# Patient Record
Sex: FEMALE | Race: WHITE | NOT HISPANIC OR LATINO | Employment: OTHER | URBAN - METROPOLITAN AREA
[De-identification: names, ages, dates, MRNs, and addresses within clinical notes are randomized per-mention and may not be internally consistent; named-entity substitution may affect disease eponyms.]

---

## 2018-08-21 ENCOUNTER — OFFICE VISIT (OUTPATIENT)
Dept: DERMATOLOGY | Facility: CLINIC | Age: 73
End: 2018-08-21
Payer: MEDICARE

## 2018-08-21 DIAGNOSIS — S20.162A: Primary | ICD-10-CM

## 2018-08-21 DIAGNOSIS — L82.1 SEBORRHEIC KERATOSIS: ICD-10-CM

## 2018-08-21 DIAGNOSIS — Z13.89 SCREENING FOR SKIN CONDITION: ICD-10-CM
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
## 2018-08-21 PROCEDURE — 99203 OFFICE O/P NEW LOW 30 MIN: CPT | Performed by: DERMATOLOGY

## 2018-08-21 RX ORDER — LEVOTHYROXINE SODIUM 100 UG/1
CAPSULE ORAL DAILY
COMMUNITY

## 2018-08-21 RX ORDER — LOSARTAN POTASSIUM 25 MG/1
TABLET ORAL
COMMUNITY

## 2018-08-21 NOTE — PATIENT INSTRUCTIONS
process on the left breast appears to be most consistent with insect bite reaction with postinflammatory erythema no treatment needed should resolve if any further changes or growth is noted patient is to call  Seborrheic Keratosis  Patient reasurred these are normal growths we acquire with age no treatment needed    Screening for Dermatologic Disorders: Nothing else of concern noted on complete exam follow up prn

## 2018-08-21 NOTE — PROGRESS NOTES
[address] [facility] DERMATOLOGY  [address] [place] [address]  [phone]  [phone]     MRN: [id number] : 1945  Encounter: [id number]  Patient Information: Dulce Key  Chief complaint:Skin check and lesion on left chest    History of present illness:  66-year-old female who not seen for many years presents for overall checkup no previous history of skin cancer presents secondary to concerns regarding lesion that she noted on her left chest 1st several weeks  Patient denies any injury of the area  No past medical history on file  No past surgical history on file  Social History   History   Alcohol use Not on file     History   Drug use: Unknown     History   Smoking Status    Not on file   Smokeless Tobacco    Not on file     No family history on file  Meds/Allergies   Allergies not on file    Meds:  Prior to Admission medications    Medication Sig Start Date End Date Taking? Authorizing Provider   Cholecalciferol (VITAMIN D3) 1000 UNIT/SPRAY LIQD Take by oral route     Yes Historical Provider, MD   Levothyroxine Sodium 100 MCG CAPS Daily   Yes Historical Provider, MD   losartan (COZAAR) 25 mg tablet 1 tab(s)   Yes Historical Provider, MD       Subjective:     Review of Systems:    General: negative for - chills, fatigue, fever,  weight gain or weight loss  Psychological: negative for - anxiety, behavioral disorder, concentration difficulties, decreased libido, depression, irritability, memory difficulties, mood swings, sleep disturbances or suicidal ideation  ENT: negative for - hearing difficulties , nasal congestion, nasal discharge, oral lesions, sinus pain, sneezing, sore throat  Allergy and Immunology: negative for - hives, insect bite sensitivity,  Hematological and Lymphatic: negative for - bleeding problems, blood clots,bruising, swollen lymph nodes  Endocrine: negative for - hair pattern changes, hot flashes, malaise/lethargy, mood swings, palpitations, polydipsia/polyuria, skin changes, temperature intolerance or unexpected weight change  Respiratory: negative for - cough, hemoptysis, orthopnea, shortness of breath, or wheezing  Cardiovascular: negative for - chest pain, dyspnea on exertion, edema,  Gastrointestinal: negative for - abdominal pain, nausea/vomiting  Genito-Urinary: negative for - dysuria, incontinence, irregular/heavy menses or urinary frequency/urgency  Musculoskeletal: negative for - gait disturbance, joint pain, joint stiffness, joint swelling, muscle pain, muscular weakness  Dermatological:  As in HPI  Neurological: negative for confusion, dizziness, headaches, impaired coordination/balance, memory loss, numbness/tingling, seizures, speech problems, tremors or weakness       Objective: There were no vitals taken for this visit  Physical Exam:    General Appearance:    Alert, cooperative, no distress   Head:    Normocephalic, without obvious abnormality, atraumatic           Skin:   A full skin exam was performed including scalp, head scalp, eyes, ears, nose, lips, neck, chest, axilla, abdomen, back, buttocks, bilateral upper extremities, bilateral lower extremities, hands, feet, fingers, toes, fingernails, and toenails  4 mm pink macule noted on the left chest which blanches with pressure no discernible epidermal changes noted normal keratotic papules with greasy stuck on appearance nothing else remarkable noted on exam     Assessment:     1  Insect bite (nonvenomous) of breast, left breast, initial encounter     2  Seborrheic keratosis     3  Screening for skin condition           Plan:    process on the left breast appears to be most consistent with insect bite reaction with postinflammatory erythema no treatment needed should resolve if any further changes or growth is noted patient is to call  Seborrheic Keratosis  Patient reasurred these are normal growths we acquire with age no treatment needed    Screening for Dermatologic Disorders: Nothing else of concern noted on complete exam follow up abelardo Valdes MD  4/81/2951,6:76 PM    Portions of the record may have been created with voice recognition software   Occasional wrong word or "sound a like" substitutions may have occurred due to the inherent limitations of voice recognition software   Read the chart carefully and recognize, using context, where substitutions have occurred

## 2019-08-23 ENCOUNTER — OFFICE VISIT (OUTPATIENT)
Dept: DERMATOLOGY | Facility: CLINIC | Age: 74
End: 2019-08-23
Payer: MEDICARE

## 2019-08-23 DIAGNOSIS — Z13.89 SCREENING FOR SKIN CONDITION: ICD-10-CM

## 2019-08-23 DIAGNOSIS — L82.1 SEBORRHEIC KERATOSIS: Primary | ICD-10-CM

## 2019-08-23 PROCEDURE — 99213 OFFICE O/P EST LOW 20 MIN: CPT | Performed by: DERMATOLOGY

## 2019-08-23 RX ORDER — DIAZEPAM 10 MG/1
10 TABLET ORAL EVERY 6 HOURS PRN
COMMUNITY

## 2019-08-23 RX ORDER — FENOFIBRATE 160 MG/1
160 TABLET ORAL DAILY
COMMUNITY

## 2019-08-23 NOTE — PROGRESS NOTES
[address]  [address] [facility] [address]  [phone]  [phone]     MRN: [id number] : 1945  Encounter: [id number]  Patient Information: Zaina Noyola  Chief complaint:  Yearly checkup    History of present illness:  70-year-old female presents for overall checkup concerned regarding little spot on the back nothing else remarkable noted  No past medical history on file  No past surgical history on file  Social History   Social History     Substance and Sexual Activity   Alcohol Use Not on file     Social History     Substance and Sexual Activity   Drug Use Not on file     Social History     Tobacco Use   Smoking Status Former Smoker   Smokeless Tobacco Never Used     No family history on file  Meds/Allergies   Allergies   Allergen Reactions    Codeine        Meds:  Prior to Admission medications    Medication Sig Start Date End Date Taking? Authorizing Provider   Cholecalciferol (VITAMIN D3) 1000 UNIT/SPRAY LIQD Take by oral route     Yes Historical Provider, MD   diazepam (VALIUM) 10 mg tablet Take 10 mg by mouth every 6 (six) hours as needed for anxiety   Yes Historical Provider, MD   fenofibrate (TRIGLIDE) 160 MG tablet Take 160 mg by mouth daily   Yes Historical Provider, MD   Levothyroxine Sodium 100 MCG CAPS Daily   Yes Historical Provider, MD   losartan (COZAAR) 25 mg tablet 1 tab(s)   Yes Historical Provider, MD       Subjective:     Review of Systems:    General: negative for - chills, fatigue, fever,  weight gain or weight loss  Psychological: negative for - anxiety, behavioral disorder, concentration difficulties, decreased libido, depression, irritability, memory difficulties, mood swings, sleep disturbances or suicidal ideation  ENT: negative for - hearing difficulties , nasal congestion, nasal discharge, oral lesions, sinus pain, sneezing, sore throat  Allergy and Immunology: negative for - hives, insect bite sensitivity,  Hematological and Lymphatic: negative for - bleeding problems, blood clots,bruising, swollen lymph nodes  Endocrine: negative for - hair pattern changes, hot flashes, malaise/lethargy, mood swings, palpitations, polydipsia/polyuria, skin changes, temperature intolerance or unexpected weight change  Respiratory: negative for - cough, hemoptysis, orthopnea, shortness of breath, or wheezing  Cardiovascular: negative for - chest pain, dyspnea on exertion, edema,  Gastrointestinal: negative for - abdominal pain, nausea/vomiting  Genito-Urinary: negative for - dysuria, incontinence, irregular/heavy menses or urinary frequency/urgency  Musculoskeletal: negative for - gait disturbance, joint pain, joint stiffness, joint swelling, muscle pain, muscular weakness  Dermatological:  As in HPI  Neurological: negative for confusion, dizziness, headaches, impaired coordination/balance, memory loss, numbness/tingling, seizures, speech problems, tremors or weakness       Objective: There were no vitals taken for this visit  Physical Exam:    General Appearance:    Alert, cooperative, no distress   Head:    Normocephalic, without obvious abnormality, atraumatic           Skin:   A full skin exam was performed including scalp, head scalp, eyes, ears, nose, lips, neck, chest, axilla, abdomen, back, buttocks, bilateral upper extremities, bilateral lower extremities, hands, feet, fingers, toes, fingernails, and toenails normal keratotic papules greasy stuck appearance nothing else atypical noted on exam     Assessment:     1  Seborrheic keratosis     2  Screening for skin condition           Plan:   Seborrheic Keratosis  Patient reasurred these are normal growths we acquire with age no treatment needed    Screening for Dermatologic Disorders: Nothing else of concern noted on complete exam follow up in 1 year       Tamika Lerner MD  8/23/2019,9:11 AM    Portions of the record may have been created with voice recognition software   Occasional wrong word or "sound a like" substitutions may have occurred due to the inherent limitations of voice recognition software   Read the chart carefully and recognize, using context, where substitutions have occurred

## 2022-10-06 ENCOUNTER — OFFICE VISIT (OUTPATIENT)
Dept: OBGYN CLINIC | Facility: CLINIC | Age: 77
End: 2022-10-06
Payer: OTHER MISCELLANEOUS

## 2022-10-06 ENCOUNTER — APPOINTMENT (OUTPATIENT)
Dept: RADIOLOGY | Facility: CLINIC | Age: 77
End: 2022-10-06
Payer: MEDICARE

## 2022-10-06 VITALS — BODY MASS INDEX: 32.32 KG/M2 | WEIGHT: 171.2 LBS | HEIGHT: 61 IN

## 2022-10-06 DIAGNOSIS — M62.81 QUADRICEPS WEAKNESS: ICD-10-CM

## 2022-10-06 DIAGNOSIS — M25.562 PAIN IN BOTH KNEES, UNSPECIFIED CHRONICITY: Primary | ICD-10-CM

## 2022-10-06 DIAGNOSIS — M25.561 PAIN IN BOTH KNEES, UNSPECIFIED CHRONICITY: ICD-10-CM

## 2022-10-06 DIAGNOSIS — M17.0 BILATERAL PRIMARY OSTEOARTHRITIS OF KNEE: ICD-10-CM

## 2022-10-06 DIAGNOSIS — M25.561 PAIN IN BOTH KNEES, UNSPECIFIED CHRONICITY: Primary | ICD-10-CM

## 2022-10-06 DIAGNOSIS — M25.562 PAIN IN BOTH KNEES, UNSPECIFIED CHRONICITY: ICD-10-CM

## 2022-10-06 PROCEDURE — 73564 X-RAY EXAM KNEE 4 OR MORE: CPT

## 2022-10-06 PROCEDURE — 99203 OFFICE O/P NEW LOW 30 MIN: CPT | Performed by: ORTHOPAEDIC SURGERY

## 2022-10-06 RX ORDER — DIAZEPAM 5 MG/1
5 TABLET ORAL 3 TIMES DAILY
COMMUNITY
Start: 2022-09-06

## 2022-10-06 RX ORDER — ASPIRIN 81 MG/1
TABLET, CHEWABLE ORAL DAILY
COMMUNITY

## 2022-10-06 RX ORDER — ALBUTEROL SULFATE 90 UG/1
AEROSOL, METERED RESPIRATORY (INHALATION)
COMMUNITY
Start: 2022-09-15

## 2022-10-06 RX ORDER — LOSARTAN POTASSIUM 100 MG/1
100 TABLET ORAL DAILY
COMMUNITY
Start: 2022-08-26

## 2022-10-06 RX ORDER — AMLODIPINE BESYLATE 5 MG/1
5 TABLET ORAL DAILY
COMMUNITY
Start: 2022-07-20

## 2022-10-06 RX ORDER — CYCLOSPORINE 0.5 MG/ML
EMULSION OPHTHALMIC
COMMUNITY

## 2022-10-06 RX ORDER — EZETIMIBE 10 MG/1
10 TABLET ORAL DAILY
COMMUNITY
Start: 2022-08-08

## 2022-10-06 RX ORDER — PANTOPRAZOLE SODIUM 40 MG/1
TABLET, DELAYED RELEASE ORAL
COMMUNITY
Start: 2022-10-01

## 2022-10-06 RX ORDER — FUROSEMIDE 20 MG/1
20 TABLET ORAL DAILY
COMMUNITY
Start: 2022-07-30

## 2022-10-06 NOTE — PROGRESS NOTES
Patient Name:  Vinita Canales  MRN:  [id number]    [address] [place] [address]     1  Pain in both knees, unspecified chronicity  -     XR knee 4+ vw right injury; Future; Expected date: 10/06/2022  -     XR knee 4+ vw left injury; Future; Expected date: 10/06/2022    2  Bilateral primary osteoarthritis of knee  -     Ambulatory Referral to Physical Therapy; Future    3  Quadriceps weakness  -     Ambulatory Referral to Physical Therapy; Future        68 y o  female with Bilateral knee osteoarthritis and quad weakness  X-rays reviewed in office today with patient  At this time, strongly suggested nonoperative treatment by means of outpatient PT to work on quad, hamstring, hip abductor strengthening, gait training, possible corticosteroid injections, ice application, and OTC oral/topical analgesics as needed for pain relief  At this time, recommended outpatient PT to work on strengthening and gait training  Advised patient she may perform exercises while sitting as she currently is having some dyspnea on exertion and will be going for follow up with oncology regarding lung nodule  Will hold off on corticosteroid injection at this time as patient recently sustained mechanical fall; can consider in the future if pain persists or worsens  Verbalized understanding of the above and will follow up in office in 8-10 weeks for reevaluation of bilateral knees  Chief Complaint     Bilateral knee pain    History of the Present Illness     Vinita Canales is a 68 y o  female with Bilateral knee pain that was exacerbated after a a fall at home in her kitchen about 6 weeks ago when her right knee gave out  She landed onto her bilateral knees causing pain  At that time she thought the pain may have been worsened by recent increase in her statin prescription  Since decreasing statins there has been no improvement her pain  Pain is located over the anterior aspect of her knees    She reports intermittent giving out of her knee secondary to pain and weakness  She notes initial injuries to her bilateral knees in 1994 when she was at a work party and slipped on so pin the bathroom landing onto both her knees  At that time she states she had a fracture in her knee that required surgery and she had an arthroscopic procedure performed as well  Soon after that she also had a left knee prepatellar bursectomy  She denies any numbness or tingling  Denies any locking or obvious swelling  No other new complaints  Review of Systems     Review of Systems   Constitutional: Negative for chills and fever  HENT: Negative for congestion  Respiratory: Negative for cough, chest tightness and stridor  Cardiovascular: Negative for chest pain and palpitations  Gastrointestinal: Negative for abdominal pain  Endocrine: Negative for cold intolerance and heat intolerance  Neurological: Negative for syncope  Psychiatric/Behavioral: Negative for confusion  Physical Exam     Ht 5' 1" (1 549 m)   Wt 77 7 kg (171 lb 3 2 oz)   BMI 32 35 kg/m²     Right Knee  Range of motion from 0 to 100  There is crepitus with range of motion  There is small effusion  There is tenderness over the medial and lateral joint lines  There is 4/5 quadriceps strength and preserved tone  The patient is able to perform a straight leg raise  positive  patellar grind test  Varus stress testing reveals no instability at 0 and 30 degrees   Valgus stress testing reveals no instability at 0 and 30 degrees   The patient is neurovascular intact distally  Left Knee  Range of motion from 0 to 110  There is crepitus with range of motion  There is small effusion  There is tenderness over the medial and lateral joint lines   There is 4/5 quadriceps strength and preserved tone  The patient is able to perform a straight leg raise        positive patellar grind test   Varus stress testing reveals no instability at 0 and 30 degrees   Valgus stress testing reveals no instability at 0 and 30 degrees  The patient is neurovascular intact distally  Eyes:  Anicteric sclerae  Neck:  Supple  Lungs:  Normal respiratory effort  Cardiovascular:  Capillary refill is less than 2 seconds  Skin:  Intact without erythema  Neurologic:  Sensation grossly intact to light touch  Psychiatric:  Mood and affect are appropriate  Data Review     I have personally reviewed pertinent films in PACS, and my interpretation follows:    X-rays taken 10/06/2022 of Right knee demonstrates mild tricompartmental osteoarthritis and chondrocalcinosis  Osteophytes noted  X-rays taken today of Left knee demonstrates mild tricompartmental osteoarthritis and chondrocalcinosis  Osteophytes noted  No acute fractures noted  Past Medical History:   Diagnosis Date    Disease of thyroid gland     Emphysema/COPD ([facility] [address] )     High cholesterol     Hypertension     Stomach disorder        Past Surgical History:   Procedure Laterality Date    BACK SURGERY      KNEE SURGERY         Allergies   Allergen Reactions    Codeine        Current Outpatient Medications on File Prior to Visit   Medication Sig Dispense Refill    albuterol (PROVENTIL HFA,VENTOLIN HFA) 90 mcg/act inhaler INHALE 2 PUFFS EVERY 6 HOURS AS NEEDED FOR WHEEZING      amLODIPine (NORVASC) 5 mg tablet Take 5 mg by mouth daily      ascorbic acid (VITAMIN C) 1000 MG tablet Take 1,000 mg by mouth daily      aspirin 81 mg chewable tablet Daily      Cholecalciferol (VITAMIN D3) 1000 UNIT/SPRAY LIQD Take by oral route        cycloSPORINE (RESTASIS) 0 05 % ophthalmic emulsion Restasis 0 05 % eye drops in a dropperette      diazepam (VALIUM) 5 mg tablet Take 5 mg by mouth 3 (three) times a day      ezetimibe (ZETIA) 10 mg tablet Take 10 mg by mouth daily      furosemide (LASIX) 20 mg tablet Take 20 mg by mouth daily      Levothyroxine Sodium 100 MCG CAPS Daily      losartan (COZAAR) 100 MG tablet Take 100 mg by mouth daily      pantoprazole (PROTONIX) 40 mg tablet       diazepam (VALIUM) 10 mg tablet Take 10 mg by mouth every 6 (six) hours as needed for anxiety (Patient not taking: No sig reported)      fenofibrate (TRIGLIDE) 160 MG tablet Take 160 mg by mouth daily (Patient not taking: No sig reported)      losartan (COZAAR) 25 mg tablet 1 tab(s) (Patient not taking: No sig reported)       No current facility-administered medications on file prior to visit  Social History     Tobacco Use    Smoking status: Former Smoker     Quit date:      Years since quittin 7    Smokeless tobacco: Never Used   Vaping Use    Vaping Use: Never used   Substance Use Topics    Alcohol use: Yes     Alcohol/week: 1 0 standard drink     Types: 1 Glasses of wine per week     Comment: occ    Drug use: Never       History reviewed  No pertinent family history            Procedures Performed     Procedures  None       Elizabeth Arce PA-C

## 2022-11-07 ENCOUNTER — TELEPHONE (OUTPATIENT)
Dept: OBGYN CLINIC | Facility: HOSPITAL | Age: 77
End: 2022-11-07

## 2022-11-07 NOTE — TELEPHONE ENCOUNTER
Caller: Aneudy Kelley    Doctor: José Luis Mcleod    Reason for call: in pain inquiring if she can come in and get cortisone shot or stronger pain med ibuprofen 800mg not helping icing and heating not helping cannot go to PT in person due to recent  co2 poisoning was given PT at home exercises but states  pain scale:20/10 patient states she cannot walk or stand even with a cane hard to get into bed etc      Call back#:  [phone]

## 2022-11-08 NOTE — TELEPHONE ENCOUNTER
Patient provide an appt for 12/8   Advised OTC pain medication - Tylenol/NSAIDS  if able to take it  Iburofen not working  Advised to try Aleve instead    Verbalized understanding

## 2022-11-19 ENCOUNTER — HOSPITAL ENCOUNTER (EMERGENCY)
Facility: HOSPITAL | Age: 77
Discharge: HOME | End: 2022-11-19
Attending: EMERGENCY MEDICINE

## 2022-11-19 ENCOUNTER — APPOINTMENT (EMERGENCY)
Dept: RADIOLOGY | Facility: HOSPITAL | Age: 77
End: 2022-11-19

## 2022-11-19 VITALS
RESPIRATION RATE: 18 BRPM | HEART RATE: 73 BPM | OXYGEN SATURATION: 98 % | TEMPERATURE: 98.7 F | SYSTOLIC BLOOD PRESSURE: 148 MMHG | DIASTOLIC BLOOD PRESSURE: 70 MMHG

## 2022-11-19 DIAGNOSIS — M25.511 CHRONIC RIGHT SHOULDER PAIN: ICD-10-CM

## 2022-11-19 DIAGNOSIS — M25.561 CHRONIC PAIN OF BOTH KNEES: Primary | ICD-10-CM

## 2022-11-19 DIAGNOSIS — M25.562 CHRONIC PAIN OF BOTH KNEES: Primary | ICD-10-CM

## 2022-11-19 DIAGNOSIS — G89.29 CHRONIC PAIN OF BOTH KNEES: Primary | ICD-10-CM

## 2022-11-19 DIAGNOSIS — G89.29 CHRONIC RIGHT SHOULDER PAIN: ICD-10-CM

## 2022-11-19 LAB
ATRIAL RATE: 65 BPM
P AXIS: 41 DEGREES
PR INTERVAL: 150 MS
QRS AXIS: 77 DEGREES
QRSD INTERVAL: 74 MS
QT INTERVAL: 380 MS
QTC INTERVAL: 395 MS
T WAVE AXIS: 44 DEGREES
VENTRICULAR RATE: 65 BPM

## 2022-11-19 RX ORDER — MELOXICAM 15 MG/1
15 TABLET ORAL DAILY
Qty: 30 TABLET | Refills: 0 | Status: SHIPPED | OUTPATIENT
Start: 2022-11-19 | End: 2022-12-19

## 2022-11-19 RX ORDER — OXYCODONE HYDROCHLORIDE 5 MG/1
5 TABLET ORAL ONCE
Status: COMPLETED | OUTPATIENT
Start: 2022-11-19 | End: 2022-11-19

## 2022-11-19 RX ADMIN — OXYCODONE HYDROCHLORIDE 5 MG: 5 TABLET ORAL at 09:21

## 2022-11-19 NOTE — DISCHARGE INSTRUCTIONS
Do NOT take other NSAIDs such as Aleve or Motrin while taking Meloxicam      You may take Tylenol  Follow up with your Family doctor and Orthopedics  Return to the emergency department for any new or worsening symptoms

## 2022-11-19 NOTE — ED PROVIDER NOTES
Pt Name: Ari Wade  MRN: [id number]  [place] 1945  Age/Sex: 68 y o  female  Date of evaluation: 11/19/2022  PCP: Ana Weir    Chief Complaint   Patient presents with   • Shoulder Pain     Patient co bilateral knee and shoulder pain after she sustained a fall 6 weeks ago  Patient reports re injuring herself and  states "On Wednesday my knee buckled and I fell again landing on both knees  " - head strike, - LOC, - blood thinners  HPI and MDM    68 y o  female presenting with b/l knee pain  States 6 weeks ago she fell because her "knees buckled"  Was seen by orthopedics, was told she has osteoarthritis, did PT  States has been taking tylenol and aleve without much improvement  This past Wednesday, she states her knees buckled again, and she fell onto her knees in her garage  Since then has been having more difficulty with walking, and now can hardly walk due to pain  Unable to complete ADLs  No numbness or tingling  No headstrike or LOC  No AC  No acute fractures my interpretation of x-rays  No focal neurological deficits  Patient states she is having difficulty with her activities of daily living however she is able to actually walk with a walker  I did however offer hospitalization, may need acute rehab/physical therapy  However, she is refusing, states she would like to go home  She is able to get help at home, and would like to follow-up outpatient with her orthopedic surgeon  Patient understands risks and benefits, she is feeling better upon re-evaluation  Provided prescription for meloxicam, advised not to use any other NSAIDs while using this, advised to take Tylenol  Rest   PCP and orthopedic follow-up, return precautions close, patient verbalized understanding and is in agreement with plan                Medications   oxyCODONE (ROXICODONE) IR tablet 5 mg (5 mg Oral Given 11/19/22 6469)         Past Medical and Surgical History    Past Medical History: Diagnosis Date   • Disease of thyroid gland    • Emphysema/COPD ([facility] [address] )    • High cholesterol    • Hypertension    • Stomach disorder        Past Surgical History:   Procedure Laterality Date   • BACK SURGERY     • KNEE SURGERY         History reviewed  No pertinent family history  Social History     Tobacco Use   • Smoking status: Former     Types: Cigarettes     Quit date:      Years since quittin 8   • Smokeless tobacco: Never   Vaping Use   • Vaping Use: Never used   Substance Use Topics   • Alcohol use: Yes     Alcohol/week: 1 0 standard drink     Types: 1 Glasses of wine per week     Comment: occ   • Drug use: Never           Allergies    Allergies   Allergen Reactions   • Codeine        Home Medications    Prior to Admission medications    Medication Sig Start Date End Date Taking? Authorizing Provider   albuterol (PROVENTIL HFA,VENTOLIN HFA) 90 mcg/act inhaler INHALE 2 PUFFS EVERY 6 HOURS AS NEEDED FOR WHEEZING 9/15/22   Historical Provider, MD   amLODIPine (NORVASC) 5 mg tablet Take 5 mg by mouth daily 22   Historical Provider, MD   ascorbic acid (VITAMIN C) 1000 MG tablet Take 1,000 mg by mouth daily    Historical Provider, MD   aspirin 81 mg chewable tablet Daily    Historical Provider, MD   Cholecalciferol (VITAMIN D3) 1000 UNIT/SPRAY LIQD Take by oral route      Historical Provider, MD   cycloSPORINE (RESTASIS) 0 05 % ophthalmic emulsion Restasis 0 05 % eye drops in a dropperette    Historical Provider, MD   diazepam (VALIUM) 10 mg tablet Take 10 mg by mouth every 6 (six) hours as needed for anxiety  Patient not taking: No sig reported    Historical Provider, MD   diazepam (VALIUM) 5 mg tablet Take 5 mg by mouth 3 (three) times a day 22   Historical Provider, MD   ezetimibe (ZETIA) 10 mg tablet Take 10 mg by mouth daily 22   Historical Provider, MD   fenofibrate (TRIGLIDE) 160 MG tablet Take 160 mg by mouth daily  Patient not taking: No sig reported    Historical Provider, MD furosemide (LASIX) 20 mg tablet Take 20 mg by mouth daily 7/30/22   Historical Provider, MD   Levothyroxine Sodium 100 MCG CAPS Daily    Historical Provider, MD   losartan (COZAAR) 100 MG tablet Take 100 mg by mouth daily 8/26/22   Historical Provider, MD   losartan (COZAAR) 25 mg tablet 1 tab(s)  Patient not taking: No sig reported    Historical Provider, MD   pantoprazole (PROTONIX) 40 mg tablet  10/1/22   Historical Provider, MD           Review of Systems    Review of Systems   Constitutional: Negative for chills and fever  HENT: Negative for rhinorrhea and sore throat  Eyes: Negative for pain and visual disturbance  Respiratory: Negative for cough and shortness of breath  Cardiovascular: Negative for chest pain and leg swelling  Gastrointestinal: Negative for abdominal pain, nausea and vomiting  Genitourinary: Negative for dysuria and hematuria  Musculoskeletal: Positive for arthralgias and gait problem  Negative for back pain  Skin: Negative for rash and wound  Neurological: Negative for syncope and headaches  Physical Exam      ED Triage Vitals   Temperature Pulse Respirations Blood Pressure SpO2   11/19/22 0832 11/19/22 0832 11/19/22 0832 11/19/22 0832 11/19/22 0832   98 7 °F (37 1 °C) 73 18 148/70 98 %      Temp Source Heart Rate Source Patient Position - Orthostatic VS BP Location FiO2 (%)   11/19/22 0832 11/19/22 0832 11/19/22 0832 11/19/22 0832 --   Temporal Monitor Sitting Left arm       Pain Score       11/19/22 0921       8               Physical Exam  Constitutional:       General: She is not in acute distress  Appearance: She is not ill-appearing  HENT:      Head: Normocephalic and atraumatic  Nose: Nose normal       Mouth/Throat:      Mouth: Mucous membranes are moist    Eyes:      Extraocular Movements: Extraocular movements intact  Pupils: Pupils are equal, round, and reactive to light     Cardiovascular:      Rate and Rhythm: Normal rate and regular rhythm  Pulmonary:      Effort: No respiratory distress  Breath sounds: Normal breath sounds  No wheezing  Abdominal:      General: There is no distension  Palpations: Abdomen is soft  Tenderness: There is no abdominal tenderness  Musculoskeletal:         General: No swelling or deformity  Cervical back: Normal range of motion and neck supple  Comments: R knee ttp anteriorly   Skin:     General: Skin is warm  Findings: No erythema  Neurological:      Mental Status: She is alert and oriented to person, place, and time  Mental status is at baseline  Diagnostic Results      Labs:    Results Reviewed     None          All labs reviewed and utilized in the medical decision making process    Radiology:    XR knee 4+ views Right injury   Final Result      No acute osseous abnormality  Degenerative changes  Chondrocalcinosis  Suprapatellar effusion  Workstation performed: [id number]         XR knee 4+ views left injury   Final Result      No acute osseous abnormality  Degenerative changes  Chondrocalcinosis  Suprapatellar effusion  Workstation performed: [id number]         XR shoulder 2+ views RIGHT   Final Result      No acute osseous abnormality  Calcifications along the superolateral humeral head which may be indicative of calcific tendinitis           Workstation performed: [id number]             All radiology studies independently viewed by me and interpreted by the radiologist     Procedure    Procedures        FINAL IMPRESSION    Final diagnoses:   Chronic pain of both knees   Chronic right shoulder pain         DISPOSITION    Time reflects when diagnosis was documented in both MDM as applicable and the Disposition within this note     Time User Action Codes Description Comment    11/19/2022 10:08 AM Anyi Garcia Add [M25 511] Right shoulder pain     11/19/2022 10:08 AM Anyi Garcia Remove [M25 511] Right shoulder pain     11/19/2022 10:08 AM Anyi Orzulay Add [M25 561,  M25 562,  G89 29] Chronic pain of both knees     11/19/2022 10:08 AM Anyi Radha Add [M25 511,  G89 29] Chronic right shoulder pain       ED Disposition     ED Disposition   Discharge    Condition   Stable    Date/Time   Sat Nov 19, 2022 10:08 AM    Comment   Aye Noyola discharge to home/self care                 Follow-up Information     Follow up With Specialties Details Ronnie Dillard DO Orthopedic Surgery Call on 11/21/2022  [address] [place] [address] [facility] Call on 11/21/2022  [address] [address]  [phone]              PATIENT REFERRED TO:    DO Radha Archibald Arthur  [address]  [facility] [address]  [phone]    Call on 11/21/2022      Walker Echols  [address] [address]  [phone]    Call on 11/21/2022        DISCHARGE MEDICATIONS:    Discharge Medication List as of 11/19/2022 10:23 AM      START taking these medications    Details   meloxicam (Mobic) 15 mg tablet Take 1 tablet (15 mg total) by mouth daily, Starting Sat 11/19/2022, Until Mon 12/19/2022, Normal         CONTINUE these medications which have NOT CHANGED    Details   albuterol (PROVENTIL HFA,VENTOLIN HFA) 90 mcg/act inhaler INHALE 2 PUFFS EVERY 6 HOURS AS NEEDED FOR WHEEZING, Historical Med      amLODIPine (NORVASC) 5 mg tablet Take 5 mg by mouth daily, Starting Wed 7/20/2022, Historical Med      ascorbic acid (VITAMIN C) 1000 MG tablet Take 1,000 mg by mouth daily, Historical Med      aspirin 81 mg chewable tablet Daily, Historical Med      Cholecalciferol (VITAMIN D3) 1000 UNIT/SPRAY LIQD Take by oral route , Historical Med      cycloSPORINE (RESTASIS) 0 05 % ophthalmic emulsion Restasis 0 05 % eye drops in a dropperette, Historical Med      !! diazepam (VALIUM) 10 mg tablet Take 10 mg by mouth every 6 (six) hours as needed for anxiety, Historical Med      !! diazepam (VALIUM) 5 mg tablet Take 5 mg by mouth 3 (three) times a day, Starting Tue 9/6/2022, Historical Med      ezetimibe (ZETIA) 10 mg tablet Take 10 mg by mouth daily, Starting Mon 8/8/2022, Historical Med      fenofibrate (TRIGLIDE) 160 MG tablet Take 160 mg by mouth daily, Historical Med      furosemide (LASIX) 20 mg tablet Take 20 mg by mouth daily, Starting Sat 7/30/2022, Historical Med      Levothyroxine Sodium 100 MCG CAPS Daily, Historical Med      !! losartan (COZAAR) 100 MG tablet Take 100 mg by mouth daily, Starting Fri 8/26/2022, Historical Med      !! losartan (COZAAR) 25 mg tablet 1 tab(s), Historical Med      pantoprazole (PROTONIX) 40 mg tablet Starting Sat 10/1/2022, Historical Med       !! - Potential duplicate medications found  Please discuss with provider  Tanis Lesch, DO        This note was partially completed using voice recognition technology, and was scanned for gross errors; however some errors may still exist  Please contact the author with any questions or requests for clarification        Tanis Lesch, DO  11/21/22 3360

## 2023-12-15 ENCOUNTER — TELEPHONE (OUTPATIENT)
Age: 78
End: 2023-12-15

## 2023-12-15 NOTE — TELEPHONE ENCOUNTER
Adama    Would like to know if she can come in sooner for her R knee than her currently scheduled appt. States it is swollen and stiff. Feels as if there iss fluid again.   Prefers el office      Callback: home- [phone]  Cell: [phone]

## 2023-12-18 ENCOUNTER — TELEPHONE (OUTPATIENT)
Age: 78
End: 2023-12-18

## 2023-12-18 NOTE — TELEPHONE ENCOUNTER
Caller: Patient     Doctor: Adama    Reason for call: Patient calling to reschedule she states her leg is very swollen she's having trouble walking as well and would like to speak with a nurse   Please advise     Call back#: [phone]

## 2023-12-18 NOTE — TELEPHONE ENCOUNTER
Caller: Patient    Doctor: Adama    Reason for call: Patient trying to get in earlier to see the doctor because of increased pain and also had an US on 12/15/23 and shows she has a Right Baker's cyst.     Call back#: [phone]

## 2023-12-18 NOTE — TELEPHONE ENCOUNTER
Caller: Patient     Doctor: Adama     Reason for call: Asked for a an appointment about getting on schedule     Call back#: [phone]

## 2023-12-21 ENCOUNTER — OFFICE VISIT (OUTPATIENT)
Dept: OBGYN CLINIC | Facility: CLINIC | Age: 78
End: 2023-12-21
Payer: MEDICARE

## 2023-12-21 VITALS
DIASTOLIC BLOOD PRESSURE: 74 MMHG | HEIGHT: 61 IN | WEIGHT: 172 LBS | HEART RATE: 69 BPM | SYSTOLIC BLOOD PRESSURE: 157 MMHG | BODY MASS INDEX: 32.47 KG/M2

## 2023-12-21 DIAGNOSIS — M25.561 CHRONIC PAIN OF RIGHT KNEE: ICD-10-CM

## 2023-12-21 DIAGNOSIS — M25.562 CHRONIC PAIN OF LEFT KNEE: ICD-10-CM

## 2023-12-21 DIAGNOSIS — G89.29 CHRONIC PAIN OF LEFT KNEE: ICD-10-CM

## 2023-12-21 DIAGNOSIS — M17.0 BILATERAL PRIMARY OSTEOARTHRITIS OF KNEE: Primary | ICD-10-CM

## 2023-12-21 DIAGNOSIS — M25.462 EFFUSION OF LEFT KNEE: ICD-10-CM

## 2023-12-21 DIAGNOSIS — G89.29 CHRONIC PAIN OF RIGHT KNEE: ICD-10-CM

## 2023-12-21 PROCEDURE — 20610 DRAIN/INJ JOINT/BURSA W/O US: CPT | Performed by: ORTHOPAEDIC SURGERY

## 2023-12-21 PROCEDURE — 99214 OFFICE O/P EST MOD 30 MIN: CPT | Performed by: ORTHOPAEDIC SURGERY

## 2023-12-21 RX ORDER — LATANOPROST 50 UG/ML
1 SOLUTION/ DROPS OPHTHALMIC
COMMUNITY
Start: 2023-09-23

## 2023-12-21 RX ORDER — LIDOCAINE HYDROCHLORIDE 10 MG/ML
2 INJECTION, SOLUTION INFILTRATION; PERINEURAL
Status: COMPLETED | OUTPATIENT
Start: 2023-12-21 | End: 2023-12-21

## 2023-12-21 RX ORDER — BUPIVACAINE HYDROCHLORIDE 2.5 MG/ML
2 INJECTION, SOLUTION INFILTRATION; PERINEURAL
Status: COMPLETED | OUTPATIENT
Start: 2023-12-21 | End: 2023-12-21

## 2023-12-21 RX ORDER — TRAZODONE HYDROCHLORIDE 50 MG/1
50 TABLET ORAL
COMMUNITY
Start: 2023-09-07

## 2023-12-21 RX ORDER — METHYLPREDNISOLONE ACETATE 40 MG/ML
2 INJECTION, SUSPENSION INTRA-ARTICULAR; INTRALESIONAL; INTRAMUSCULAR; SOFT TISSUE
Status: COMPLETED | OUTPATIENT
Start: 2023-12-21 | End: 2023-12-21

## 2023-12-21 RX ORDER — TRAMADOL HYDROCHLORIDE 50 MG/1
50 TABLET ORAL EVERY 6 HOURS PRN
COMMUNITY
Start: 2023-10-31

## 2023-12-21 RX ADMIN — METHYLPREDNISOLONE ACETATE 2 ML: 40 INJECTION, SUSPENSION INTRA-ARTICULAR; INTRALESIONAL; INTRAMUSCULAR; SOFT TISSUE at 10:45

## 2023-12-21 RX ADMIN — BUPIVACAINE HYDROCHLORIDE 2 ML: 2.5 INJECTION, SOLUTION INFILTRATION; PERINEURAL at 10:45

## 2023-12-21 RX ADMIN — LIDOCAINE HYDROCHLORIDE 2 ML: 10 INJECTION, SOLUTION INFILTRATION; PERINEURAL at 10:45

## 2023-12-21 NOTE — PROGRESS NOTES
"Patient Name:  Orin Noyola  MRN:  [id number]    Assessment & Plan     1. Bilateral primary osteoarthritis of knee  -     Large joint arthrocentesis: R knee  -     Large joint arthrocentesis: L knee    2. Effusion of left knee  -     Large joint arthrocentesis: L knee    3. Chronic pain of right knee  -     Large joint arthrocentesis: R knee    4. Chronic pain of left knee  -     Large joint arthrocentesis: L knee      Bilateral knee osteoarthritis  Reviewed x-rays with patient  Discussed nonoperative treatments of bilateral knee osteoarthritis and patient wished to move forward with bilateral knee CSI and Right knee aspiration.  Right knee aspiration yielded 17cc of synovial fluid. Tolerated procedures well.   Continue OTC medication as needed for pain relief  Can consider visco injections in the future if pain persists or worsens  Would recommend against baker's cyst aspiration secondary to high likelihood of reoccurrence. Patient agreeable.  Follow up in 3 months if pain persists     History of the Present Illness   Orin Noyola is a 78 y.o. female with Bilateral knee osteoarthritis. Today, patient reports Right knee pain worse than the Left. She admits to some swelling in the knee with difficult ROM and gait. She locates pain to the back of her knee. She admits the Left knee is starting to cause her pain, as well, and is considering CSI today.         Review of Systems     Review of Systems   Constitutional:  Negative for chills and fever.   HENT:  Negative for congestion.    Respiratory:  Negative for cough, chest tightness and shortness of breath.    Cardiovascular:  Negative for chest pain and palpitations.   Gastrointestinal:  Negative for abdominal pain.   Endocrine: Negative for cold intolerance and heat intolerance.   Neurological:  Negative for syncope.   Psychiatric/Behavioral:  Negative for confusion.        Physical Exam     /74   Pulse 69   Ht 5' 1\" (1.549 m)   Wt 78 kg (172 lb)  "  BMI 32.50 kg/m²     Right Knee  Range of motion from 0 to 120 degrees.    There is small effusion.    There is tenderness over the medial joint line.    The patient is able to perform a straight leg raise with 4+/5 quad strength    Varus stress testing reveals no pain or instability at 0 and 30 degrees   Valgus stress testing reveals no pain or instability at 0 and 30 degrees  The patient is neurovascular intact distally.    Left  Knee  Range of motion from 0 to 125 degrees.    There is no effusion.    There is no tenderness over the knee.    The patient is able to perform a straight leg raise.    Varus stress testing reveals no pain or instability at 0 and 30 degrees   Valgus stress testing reveals no pain or instability at 0 and 30 degrees  The patient is neurovascular intact distally.    Data Review     I have personally reviewed pertinent films in PACS, and my interpretation follows.    X-rays taken 10/06/2022 of Right knee demonstrate mild to moderate tricompartmental osteoarthritis and chondrocalcinosis. Osteophytes noted.     X-rays taken 10/06/2022 of Left knee demonstrate mild to moderate tricompartmental osteoarthritis and chondrocalcinosis. Osteophytes noted. No acute fractures noted.      Social History     Tobacco Use    Smoking status: Former     Current packs/day: 0.00     Types: Cigarettes     Quit date: 2018     Years since quittin.9    Smokeless tobacco: Never   Vaping Use    Vaping status: Never Used   Substance Use Topics    Alcohol use: Yes     Alcohol/week: 1.0 standard drink of alcohol     Types: 1 Glasses of wine per week     Comment: occ    Drug use: Never           Large joint arthrocentesis: R knee  Universal Protocol:  Risks and benefits: risks, benefits and alternatives were discussed  Consent given by: patient  Patient identity confirmed: verbally with patient  Procedure Details  Location: knee - R knee  Needle size: 22 G  Approach: lateral  Medications administered: 2 mL  bupivacaine 0.25 %; 2 mL lidocaine 1 %; 2 mL methylPREDNISolone acetate 40 mg/mL    Aspirate amount: 17 mL  Patient tolerance: patient tolerated the procedure well with no immediate complications  Dressing:  Sterile dressing applied      Large joint arthrocentesis: L knee  Universal Protocol:  Risks and benefits: risks, benefits and alternatives were discussed  Consent given by: patient  Patient identity confirmed: verbally with patient  Procedure Details  Location: knee - L knee  Needle size: 22 G  Approach: lateral  Medications administered: 2 mL bupivacaine 0.25 %; 2 mL lidocaine 1 %; 2 mL methylPREDNISolone acetate 40 mg/mL    Patient tolerance: patient tolerated the procedure well with no immediate complications  Dressing:  Sterile dressing applied            Denilson Galan DO

## 2024-02-05 ENCOUNTER — OFFICE VISIT (OUTPATIENT)
Dept: OBGYN CLINIC | Facility: CLINIC | Age: 79
End: 2024-02-05
Payer: MEDICARE

## 2024-02-05 VITALS
HEIGHT: 61 IN | DIASTOLIC BLOOD PRESSURE: 77 MMHG | HEART RATE: 65 BPM | BODY MASS INDEX: 32.77 KG/M2 | SYSTOLIC BLOOD PRESSURE: 147 MMHG | WEIGHT: 173.6 LBS

## 2024-02-05 DIAGNOSIS — M25.461 EFFUSION OF RIGHT KNEE: Primary | ICD-10-CM

## 2024-02-05 DIAGNOSIS — M17.11 PRIMARY OSTEOARTHRITIS OF RIGHT KNEE: ICD-10-CM

## 2024-02-05 DIAGNOSIS — M25.561 CHRONIC PAIN OF RIGHT KNEE: ICD-10-CM

## 2024-02-05 DIAGNOSIS — G89.29 CHRONIC PAIN OF RIGHT KNEE: ICD-10-CM

## 2024-02-05 PROCEDURE — 99213 OFFICE O/P EST LOW 20 MIN: CPT | Performed by: ORTHOPAEDIC SURGERY

## 2024-02-05 RX ORDER — ALENDRONATE SODIUM 70 MG/1
TABLET ORAL
COMMUNITY

## 2024-02-05 RX ORDER — ALBUTEROL SULFATE 2.5 MG/3ML
2.5 SOLUTION RESPIRATORY (INHALATION) EVERY 6 HOURS PRN
COMMUNITY
Start: 2024-01-29

## 2024-02-05 NOTE — PROGRESS NOTES
Patient Name:  Orin Noyola  MRN:  [id number]    Assessment & Plan     1. Effusion of right knee  -     Injection Procedure Prior Authorization; Future    2. Chronic pain of right knee  -     Injection Procedure Prior Authorization; Future    3. Primary osteoarthritis of right knee  -     Injection Procedure Prior Authorization; Future        78 y.o. female with Right knee osteoarthritis, recurrent effusion.   Treatment options were discussed including viscosupplementation, elevation, compression stockings  Advised patient to continue using elevation and compression stockings  Patient should follow up with her PCP regarding lower extremity pitting edema  Preauthorization was requested for right knee Durolane  Patient will follow up once viscosupplementation is approved    History of the Present Illness   Orin Noyola is a 78 y.o. female with Bilateral knee osteoarthritis. She was last seen in the office 12/21/23 when she was provided a right knee aspiration and bilateral knee CSI. Her pain today is rated 7/10. She has difficulty going up and down stairs or walking through stores due to pain. She notes improvement from the corticosteroid injection. She thinks she is still 40-50% improved. She has been using compression stockings. She admits to having lower extremity swelling due to congestive heart failure.         Review of Systems     Review of Systems   Constitutional:  Negative for chills and fever.   HENT:  Negative for ear pain and sore throat.    Eyes:  Negative for pain and visual disturbance.   Respiratory:  Negative for cough and shortness of breath.    Cardiovascular:  Negative for chest pain and palpitations.   Gastrointestinal:  Negative for abdominal pain and vomiting.   Genitourinary:  Negative for dysuria and hematuria.   Musculoskeletal:  Negative for arthralgias and back pain.   Skin:  Negative for color change and rash.   Neurological:  Negative for seizures and syncope.   All other  "systems reviewed and are negative.      Physical Exam     /77   Pulse 65   Ht 5' 1\" (1.549 m)   Wt 78.7 kg (173 lb 9.6 oz)   BMI 32.80 kg/m²     Right Knee  Bilateral pitting edema  Range of motion from 5 to 120.    There is mild effusion.    There is mild tenderness over the medial joint line.    The patient is able to perform a straight leg raise with 4+/5 strength.    Varus stress testing reveals no instability at 0 and 30 degrees   Valgus stress testing reveals no instability at 0 and 30 degrees  The patient is neurovascular intact distally.      Data Review     I have personally reviewed pertinent films in PACS, and my interpretation follows.    X-rays taken 10/06/2022 of Right knee demonstrate mild to moderate tricompartmental osteoarthritis and chondrocalcinosis. Osteophytes noted.     Social History     Tobacco Use    Smoking status: Former     Current packs/day: 0.00     Types: Cigarettes     Quit date:      Years since quittin.0    Smokeless tobacco: Never   Vaping Use    Vaping status: Never Used   Substance Use Topics    Alcohol use: Yes     Alcohol/week: 1.0 standard drink of alcohol     Types: 1 Glasses of wine per week     Comment: occ    Drug use: Never           Procedures  None.    Damari Justin   Scribe Attestation      I,:  Damari Justin am acting as a scribe while in the presence of the attending physician.:       I,:  Denilson Galan, DO personally performed the services described in this documentation    as scribed in my presence.:             "

## 2024-02-26 ENCOUNTER — PROCEDURE VISIT (OUTPATIENT)
Dept: OBGYN CLINIC | Facility: CLINIC | Age: 79
End: 2024-02-26
Payer: MEDICARE

## 2024-02-26 VITALS
HEIGHT: 61 IN | SYSTOLIC BLOOD PRESSURE: 153 MMHG | DIASTOLIC BLOOD PRESSURE: 73 MMHG | HEART RATE: 72 BPM | WEIGHT: 173 LBS | BODY MASS INDEX: 32.66 KG/M2

## 2024-02-26 DIAGNOSIS — M17.11 PRIMARY OSTEOARTHRITIS OF RIGHT KNEE: Primary | ICD-10-CM

## 2024-02-26 PROCEDURE — 20610 DRAIN/INJ JOINT/BURSA W/O US: CPT | Performed by: ORTHOPAEDIC SURGERY

## 2024-02-26 RX ORDER — LEVALBUTEROL INHALATION SOLUTION 0.63 MG/3ML
3 SOLUTION RESPIRATORY (INHALATION) EVERY 4 HOURS PRN
COMMUNITY
Start: 2024-02-21 | End: 2025-02-20

## 2024-02-26 RX ORDER — MIRTAZAPINE 15 MG/1
1 TABLET, FILM COATED ORAL
COMMUNITY

## 2024-02-26 NOTE — PROGRESS NOTES
Patient has failed at least three months of conservative therapy including CSI, OTC medications , patient symptoms include pain with range of motion and ambulation, pain is rated at 7/10.  After discussing the options for treatment, the patient elected to proceed forward with Right knee Durolane injection to reduce pain. Risks of injection, including but not limited to, post-injection pain, swelling, pseudo septic reaction, skin rash, itching, and infection were discussed in detail.  The patient understood, had no further questions and elected to proceed forward.  After sterile preparation, the  Durolane  injection was injected into the Right knee.  The patient tolerated the procedure well no complications were noted.  The patient was instructed ice and elevate the extremity, limit strenuous activity for the next 2-3 days, and to contact us if there were any questions or concerns prior to their follow-up appointment.  I will see the patient back in 3 months for reevaluation or as needed pending symptoms.        Large joint arthrocentesis: R knee  Universal Protocol:  Risks and benefits: risks, benefits and alternatives were discussed  Consent given by: patient  Patient identity confirmed: verbally with patient  Supporting Documentation  Indications: pain   Procedure Details  Location: knee - R knee  Needle size: 22 G  Approach: lateral  Medications administered: 3 mL sodium hyaluronate 60 MG/3ML    Patient tolerance: patient tolerated the procedure well with no immediate complications  Dressing:  Sterile dressing applied

## 2024-03-11 ENCOUNTER — TELEPHONE (OUTPATIENT)
Age: 79
End: 2024-03-11

## 2024-03-11 NOTE — TELEPHONE ENCOUNTER
Caller: Patient     Doctor: Adama     Reason for call: Patient asked if we can order an MRI, Patient states she in a lot of pain and would like to discuss, please call after 1    Call back#: [phone] or [phone]